# Patient Record
(demographics unavailable — no encounter records)

---

## 2024-11-25 NOTE — HISTORY OF PRESENT ILLNESS
[FreeTextEntry1] : 54-year-old male presents for follow-up visit for GI symptoms.  He has a history of diverticulitis for which he underwent a laparoscopic sigmoidectomy in April 2023.  He was seen a few months ago with intermittent severe crampy abdominal pain as well as constipation.  His symptoms were further evaluated by Dr. Victoria who performed upper and lower endoscopy and he has since been treated for H. pylori.  He is here to discuss possible role of surgery for scar tissue as the source of his symptoms.  He feels that all of those symptoms have resolved.  He no longer has the severe upper abdominal pain.  His constipation has improved although a recent MRI showed a moderate amount of stool burden throughout the colon.  There was no obvious pathology of his intestine and no evidence of inflammation although it was limited by the lack of contrast.

## 2024-11-25 NOTE — DATA REVIEWED
[FreeTextEntry1] : MRI abdomen/pelvis IMPRESSION: Somewhat limited evaluation of the bowel loops without IV contrast. No evidence for stomach or small bowel wall thickening. Stool-filled colon suggesting mild constipation. Diverticulosis mainly along the sigmoid without any evidence for diverticulitis.

## 2024-11-25 NOTE — PHYSICAL EXAM
[Respiratory Effort] : normal respiratory effort [Calm] : calm [de-identified] : Soft, nontender, nondistended.  [de-identified] : Well-appearing, in no distress [de-identified] : Normocephalic, atraumatic [de-identified] : Moves extremities without difficulty [de-identified] : Warm and dry

## 2024-11-25 NOTE — PLAN
[TextEntry] : 54-year-old male with intermittent abdominal pain and constipation which has improved with treatment of H. pylori infection.  Over the past week, he has been having daily to twice daily bowel movements without any significant change in his diet or medications to account for this.  I do not think there is any role of lysis of adhesions since he has no current symptoms.  If symptoms return, he will follow-up for discussion.  May benefit from fiber supplement or stool softeners to help with constipation.